# Patient Record
Sex: FEMALE | Race: WHITE | NOT HISPANIC OR LATINO | Employment: FULL TIME | URBAN - METROPOLITAN AREA
[De-identification: names, ages, dates, MRNs, and addresses within clinical notes are randomized per-mention and may not be internally consistent; named-entity substitution may affect disease eponyms.]

---

## 2017-11-14 ENCOUNTER — HOSPITAL ENCOUNTER (EMERGENCY)
Facility: HOSPITAL | Age: 42
Discharge: HOME | End: 2017-11-14
Payer: OTHER MISCELLANEOUS

## 2017-11-14 VITALS
RESPIRATION RATE: 20 BRPM | HEIGHT: 67 IN | HEART RATE: 78 BPM | TEMPERATURE: 98 F | SYSTOLIC BLOOD PRESSURE: 132 MMHG | BODY MASS INDEX: 16.65 KG/M2 | OXYGEN SATURATION: 98 % | DIASTOLIC BLOOD PRESSURE: 65 MMHG | WEIGHT: 106.06 LBS

## 2017-11-14 DIAGNOSIS — M62.830 BACK SPASM: Primary | ICD-10-CM

## 2017-11-14 PROCEDURE — 63600175 PHARM REV CODE 636 W HCPCS: Performed by: PHYSICIAN ASSISTANT

## 2017-11-14 PROCEDURE — 96372 THER/PROPH/DIAG INJ SC/IM: CPT

## 2017-11-14 PROCEDURE — 99283 EMERGENCY DEPT VISIT LOW MDM: CPT | Mod: 25

## 2017-11-14 RX ORDER — ORPHENADRINE CITRATE 30 MG/ML
60 INJECTION INTRAMUSCULAR; INTRAVENOUS
Status: COMPLETED | OUTPATIENT
Start: 2017-11-14 | End: 2017-11-14

## 2017-11-14 RX ORDER — DICLOFENAC SODIUM 75 MG/1
75 TABLET, DELAYED RELEASE ORAL 2 TIMES DAILY
Qty: 20 TABLET | Refills: 0 | Status: SHIPPED | OUTPATIENT
Start: 2017-11-14 | End: 2018-07-19

## 2017-11-14 RX ORDER — METHOCARBAMOL 500 MG/1
1000 TABLET, FILM COATED ORAL 3 TIMES DAILY
Qty: 30 TABLET | Refills: 0 | Status: SHIPPED | OUTPATIENT
Start: 2017-11-14 | End: 2017-11-19

## 2017-11-14 RX ORDER — ORPHENADRINE CITRATE 30 MG/ML
INJECTION INTRAMUSCULAR; INTRAVENOUS
Status: DISCONTINUED
Start: 2017-11-14 | End: 2017-11-14 | Stop reason: HOSPADM

## 2017-11-14 RX ADMIN — ORPHENADRINE CITRATE 60 MG: 30 INJECTION INTRAMUSCULAR; INTRAVENOUS at 08:11

## 2017-11-14 NOTE — ED PROVIDER NOTES
Encounter Date: 11/14/2017       History     Chief Complaint   Patient presents with    Back Pain     Patient c/o low back pain, patient states she was lifting boxes at work this morning and felt a pull to her low back      The history is provided by the patient.   Back Pain    This is a new problem. The current episode started several days ago. The problem occurs occasionally. The problem has been unchanged. The pain is associated with lifting heavy objects. The pain is present in the lumbar spine. The quality of the pain is described as aching. The pain does not radiate. The pain is at a severity of 3/10. The symptoms are aggravated by bending, twisting and certain positions. The pain is the same all the time. Stiffness is present all day. Pertinent negatives include no chest pain, no fever, no numbness, no weight loss, no headaches, no abdominal pain, no abdominal swelling, no bowel incontinence, no perianal numbness, no bladder incontinence, no dysuria, no pelvic pain, no leg pain, no paresthesias, no paresis, no tingling and no weakness. She has tried nothing for the symptoms.     Review of patient's allergies indicates:  No Known Allergies  History reviewed. No pertinent past medical history.  History reviewed. No pertinent surgical history.  History reviewed. No pertinent family history.  Social History   Substance Use Topics    Smoking status: Current Every Day Smoker     Packs/day: 1.00     Types: Cigarettes    Smokeless tobacco: Current User    Alcohol use No     Review of Systems   Constitutional: Negative for chills, fever and weight loss.   HENT: Negative for sore throat.    Eyes: Negative for redness.   Respiratory: Negative for cough and shortness of breath.    Cardiovascular: Negative for chest pain.   Gastrointestinal: Negative for abdominal pain, bowel incontinence, diarrhea and nausea.   Endocrine: Negative for polydipsia and polyphagia.   Genitourinary: Negative for bladder incontinence,  dysuria and pelvic pain.   Musculoskeletal: Positive for back pain. Negative for arthralgias and myalgias.   Skin: Negative for rash.   Neurological: Negative for tingling, weakness, numbness, headaches and paresthesias.   Hematological: Does not bruise/bleed easily.   Psychiatric/Behavioral: The patient is not nervous/anxious.    All other systems reviewed and are negative.      Physical Exam     Initial Vitals [11/14/17 0801]   BP Pulse Resp Temp SpO2   129/70 103 18 98.7 °F (37.1 °C) 100 %      MAP       89.67         Physical Exam    Nursing note and vitals reviewed.  Constitutional: Vital signs are normal. She appears well-developed and well-nourished. No distress.   HENT:   Head: Normocephalic and atraumatic.   Right Ear: External ear normal.   Left Ear: External ear normal.   Nose: Nose normal.   Mouth/Throat: Oropharynx is clear and moist.   Eyes: Conjunctivae, EOM and lids are normal. Pupils are equal, round, and reactive to light.   Neck: Normal range of motion and full passive range of motion without pain. Neck supple.   Cardiovascular: Normal rate, regular rhythm, S1 normal, S2 normal, normal heart sounds, intact distal pulses and normal pulses.   Pulmonary/Chest: Breath sounds normal. No respiratory distress. She has no wheezes. She has no rales.   Abdominal: Soft. Normal appearance and bowel sounds are normal. She exhibits no distension. There is no tenderness.   Musculoskeletal:        Lumbar back: She exhibits decreased range of motion, tenderness, pain and spasm. She exhibits no bony tenderness, no swelling, no edema, no deformity, no laceration and normal pulse.        Back:    Lymphadenopathy:     She has no cervical adenopathy.   Neurological: She is alert and oriented to person, place, and time. She has normal strength. No cranial nerve deficit or sensory deficit. Coordination and gait normal.   Skin: Skin is warm, dry and intact.   Psychiatric: She has a normal mood and affect. Her speech is  normal and behavior is normal. Judgment and thought content normal. Cognition and memory are normal.         ED Course   Procedures  Labs Reviewed - No data to display                            ED Course      Clinical Impression:   The encounter diagnosis was Back spasm.    Disposition:   Disposition: Discharged  Condition: Stable                        LEILANI Morrissey  11/14/17 1000

## 2018-07-19 ENCOUNTER — HOSPITAL ENCOUNTER (EMERGENCY)
Facility: HOSPITAL | Age: 43
Discharge: HOME | End: 2018-07-19
Attending: EMERGENCY MEDICINE
Payer: OTHER MISCELLANEOUS

## 2018-07-19 VITALS
HEIGHT: 66 IN | DIASTOLIC BLOOD PRESSURE: 67 MMHG | WEIGHT: 103.94 LBS | SYSTOLIC BLOOD PRESSURE: 117 MMHG | RESPIRATION RATE: 16 BRPM | OXYGEN SATURATION: 99 % | TEMPERATURE: 98 F | HEART RATE: 90 BPM | BODY MASS INDEX: 16.71 KG/M2

## 2018-07-19 DIAGNOSIS — T14.8XXA MUSCLE STRAIN: ICD-10-CM

## 2018-07-19 DIAGNOSIS — M79.601 PAIN IN RIGHT ARM: Primary | ICD-10-CM

## 2018-07-19 PROCEDURE — 99283 EMERGENCY DEPT VISIT LOW MDM: CPT

## 2018-07-19 RX ORDER — HYDROCODONE BITARTRATE AND ACETAMINOPHEN 7.5; 325 MG/1; MG/1
1 TABLET ORAL EVERY 6 HOURS PRN
Qty: 16 TABLET | Refills: 0 | OUTPATIENT
Start: 2018-07-19 | End: 2024-02-09

## 2018-07-19 NOTE — ED NOTES
Pt examined by Dr Guzman  without RN, educated on prescriptions, given discharge instructions and discharged to [facility]. See provider notes for exam.

## 2018-07-19 NOTE — ED PROVIDER NOTES
SCRIBE #1 NOTE: I, Corinne Mack, am scribing for, and in the presence of, Alice Guzman DO. I have scribed the entire note.      History      Chief Complaint   Patient presents with    Shoulder Injury     pt had a right rotator cuff repair may 30 and a shelf fell on her arm while stocking at work Monday. pt reports her shoulder is sore       Review of patient's allergies indicates:  No Known Allergies     HPI   HPI    7/19/2018, 7:25 AM   History obtained from the patient      History of Present Illness: Amy Leann Goodpasture is a 42 y.o. female patient with PMHx of R rotator cuff repair on 05/30/18 who presents to the Emergency Department for R shoulder pain which onset suddenly 07/16/18. Pt reports that a shelf fell on her shoulder at work. Pt caught the shelf and had to hold it up 20-30 minutes before someone was able to help her. Pt now c/o increased pain to her R shoulder. Pt rates her pain as 7/10. Symptoms are constant and moderate in severity. Movement and palpation worsens the pt's pain. No mitigating factors reported. No associated sxs reported. Patient denies any CP, SOB, N/V, back pain, neck pain, head injury, LOC, HA, dizziness, and all other sxs at this time. No prior Tx reported. Pt reports that she is out of her oxycodone 7.5 and is unable to tolerate her norco 10. Pt reports that she becomes nauseated and vomits when she takes norco 10. No further complaints or concerns at this time. Pt is a former smoker.         Arrival mode: Personal vehicle    PCP: Primary Doctor No       Past Medical History:  Past medical history reviewed not relevant      Past Surgical History:  Past surgical history reviewed not relevant      Family History:  Family history reviewed not relevant      Social History:  Former smoker, no drugs, no alcohol    ROS   Review of Systems   Constitutional: Negative for chills and fever.   HENT: Negative for nosebleeds and sore throat.         (-) head injury   Respiratory:  Negative for cough and shortness of breath.    Cardiovascular: Negative for chest pain and leg swelling.   Gastrointestinal: Negative for abdominal pain, nausea and vomiting.   Genitourinary: Negative for dysuria.   Musculoskeletal: Positive for arthralgias (R shoulder secondary to injury). Negative for back pain, neck pain and neck stiffness.        (-) fall   Skin: Negative for rash and wound.   Neurological: Negative for dizziness, weakness, light-headedness, numbness and headaches.        (-) LOC   Hematological: Does not bruise/bleed easily.   All other systems reviewed and are negative.    Physical Exam      Initial Vitals [07/19/18 0451]   BP Pulse Resp Temp SpO2   117/67 90 16 98.1 °F (36.7 °C) 99 %      MAP       --          Physical Exam  Nursing Notes and Vital Signs Reviewed.  Constitutional: Patient is in no apparent distress. Well-developed and well-nourished.  Head: Atraumatic. Normocephalic.  Eyes: PERRL. EOM intact. Conjunctivae are not pale. No scleral icterus.  ENT: Mucous membranes are moist. Oropharynx is clear and symmetric.    Neck: Supple. Full ROM. No lymphadenopathy.  Cardiovascular: Regular rate. Regular rhythm. No murmurs, rubs, or gallops. Distal pulses are 2+ and symmetric.  Pulmonary/Chest: No respiratory distress. Clear to auscultation bilaterally. No wheezing or rales.  Abdominal: Soft and non-distended.  There is no tenderness.  No rebound, guarding, or rigidity.   Musculoskeletal: Moves all extremities. No obvious deformities. No edema. .   Right Hand: No obvious deformity. There is no swelling.  There is no tenderness.  No snuff box tenderness. Full flexion and extension of the wrist.  Full flexion and extension of all fingers at the DIP, PIP and MCP joints.  Normal finger abduction, adduction, and thumb opposition.  Normal OK sign.  Able to make a fist without scissoring.  No laxity of the ulnar or radial collateral ligaments of the phalanges.  Radial, median, and ulnar nerves  "are intact, both motor and sensory.. Radial and ulnar pulses are 2+. Normal capillary refill.  Distal sensation is intact.   RLE: no evident deformity. Negative for swelling. Positive for tenderness to the deltoid and bicep muscles.. ROM is limited secondary to recent surgery. Pt is not allowed to reach across her chest secondary to limitation of mobility secondary to recent surgery. Cap refill distally is <2 seconds. DP and PT pulses are equal and 2+ bilaterally. No distal sensory deficit  Skin: Warm and dry.  Neurological:  Alert, awake, and appropriate.  Normal speech.  No acute focal neurological deficits are appreciated.  Psychiatric: Normal affect. Good eye contact. Appropriate in content.    ED Course    Procedures  ED Vital Signs:  Vitals:    07/19/18 0451   BP: 117/67   Pulse: 90   Resp: 16   Temp: 98.1 °F (36.7 °C)   TempSrc: Oral   SpO2: 99%   Weight: 47.1 kg (103 lb 15.2 oz)   Height: 5' 6" (1.676 m)       Abnormal Lab Results:  Labs Reviewed - No data to display     All Lab Results:  None    Imaging Results:  Imaging Results    None               7:24 AM:  reviewed  06/06/2018 1  06/06/2018 HYDROCODONE-ACETAMIN  MG 40 10 [id number] [facility] 0 40.00 [facility] [place]   05/25/2018 2  05/24/2018 OXYCODONE-ACETAMINOPHEN  40 4 [id number] [facility] 0 150.00 [facility] [place]   05/25/2018 1  05/24/2018 HYDROCODONE-ACETAMIN  MG 40 6 [id number] [facility] 0 66.67 [facility] [place]   05/17/2018 1  05/17/2018 HYDROCODONE-ACETAMIN 7.5-325 20 5 [id number] [facility] 0 30.00 [place] [place]   12/08/2016 1  12/08/2016 HYDROCODONE-ACETAMIN 7.5-325 15 3 [id number] [facility]  0 37.50 [facility] [place]     7:42 AM   I discussed with the patient/guardian regarding the the quantity of the opioid.  I discussed the patient/guardian's option to fill the prescription in a lesser quantity.   I also discussed with the patient/guardian the risks associated with the opioid.      The Emergency Provider reviewed the " vital signs and test results, which are outlined above.    ED Discussion     7:44 AM: Pt is awake, alert, and in no distress. Discussed pt dx and plan of tx. Pt should resume wearing her sling and continue her weight lifting restrictions of no more than 1 lbs. Pt's orthopedist does not wish the pt to take antiinflammatories. Therefore the pt will be sent home with norco. Gave pt all f/u and return to the ED instructions. All questions and concerns were addressed at this time. Pt expresses understanding of information and instructions, and is comfortable with plan to discharge. Pt is stable for discharge.    I discussed with patient and/or family/caretaker that evaluation in the ED does not suggest any emergent or life threatening medical conditions requiring immediate intervention beyond what was provided in the ED, and I believe patient is safe for discharge.  Regardless, an unremarkable evaluation in the ED does not preclude the development or presence of a serious of life threatening condition. As such, patient was instructed to return immediately for any worsening or change in current symptoms.      ED Medication(s):  Medications - No data to display    Discharge Medication List as of 7/19/2018  7:45 AM      START taking these medications    Details   HYDROcodone-acetaminophen (NORCO) 7.5-325 mg per tablet Take 1 tablet by mouth every 6 (six) hours as needed., Starting Thu 7/19/2018, Print             Follow-up Information     Joseluis Vides Jr, MD.    Specialty:  Orthopedic Surgery  Why:  Appointment scheduled on July 23, 2018.   Return to emergency department for fever, numbness or weakness to the arm, or other concerns.  Do not lift more than 1 lb.  Contact information:  [address] [facility]  [address]  [facility] [address] [phone]             Please follow up.    Contact information:  Consider going to occupational health for urine drug screen.                   Medical Decision Making               Scribe Attestation:   Scribe #1: I performed the above scribed service and the documentation accurately describes the services I performed. I attest to the accuracy of the note.    Attending:   Physician Attestation Statement for Scribe #1: I, Alice Guzman DO, personally performed the services described in this documentation, as scribed by Corinne Mack, in my presence, and it is both accurate and complete.          Clinical Impression       ICD-10-CM ICD-9-CM   1. Pain in right arm M79.601 729.5   2. Muscle strain T14.8XXA 848.9       Disposition:   Disposition: Discharged  Condition: Stable           Alice Guzman DO  07/19/18 7202

## 2021-09-01 ENCOUNTER — LAB VISIT (OUTPATIENT)
Dept: PRIMARY CARE CLINIC | Facility: OTHER | Age: 46
End: 2021-09-01
Attending: INTERNAL MEDICINE

## 2021-09-01 DIAGNOSIS — R05.9 COUGH: ICD-10-CM

## 2021-09-01 DIAGNOSIS — R06.02 SHORTNESS OF BREATH: ICD-10-CM

## 2021-09-01 PROCEDURE — U0003 INFECTIOUS AGENT DETECTION BY NUCLEIC ACID (DNA OR RNA); SEVERE ACUTE RESPIRATORY SYNDROME CORONAVIRUS 2 (SARS-COV-2) (CORONAVIRUS DISEASE [COVID-19]), AMPLIFIED PROBE TECHNIQUE, MAKING USE OF HIGH THROUGHPUT TECHNOLOGIES AS DESCRIBED BY CMS-2020-01-R: HCPCS | Performed by: INTERNAL MEDICINE

## 2021-09-02 LAB
SARS-COV-2 RNA RESP QL NAA+PROBE: DETECTED
SARS-COV-2- CYCLE NUMBER: 15

## 2021-09-10 ENCOUNTER — LAB VISIT (OUTPATIENT)
Dept: PRIMARY CARE CLINIC | Facility: OTHER | Age: 46
End: 2021-09-10
Attending: INTERNAL MEDICINE

## 2021-09-10 DIAGNOSIS — R05.9 COUGH: ICD-10-CM

## 2021-09-10 PROCEDURE — U0003 INFECTIOUS AGENT DETECTION BY NUCLEIC ACID (DNA OR RNA); SEVERE ACUTE RESPIRATORY SYNDROME CORONAVIRUS 2 (SARS-COV-2) (CORONAVIRUS DISEASE [COVID-19]), AMPLIFIED PROBE TECHNIQUE, MAKING USE OF HIGH THROUGHPUT TECHNOLOGIES AS DESCRIBED BY CMS-2020-01-R: HCPCS | Performed by: INTERNAL MEDICINE

## 2021-09-11 LAB
SARS-COV-2 RNA RESP QL NAA+PROBE: DETECTED
SARS-COV-2- CYCLE NUMBER: 42

## 2021-09-15 ENCOUNTER — LAB VISIT (OUTPATIENT)
Dept: PRIMARY CARE CLINIC | Facility: OTHER | Age: 46
End: 2021-09-15
Attending: INTERNAL MEDICINE

## 2021-09-15 DIAGNOSIS — Z20.822 ENCOUNTER FOR LABORATORY TESTING FOR COVID-19 VIRUS: ICD-10-CM

## 2021-09-15 PROCEDURE — U0003 INFECTIOUS AGENT DETECTION BY NUCLEIC ACID (DNA OR RNA); SEVERE ACUTE RESPIRATORY SYNDROME CORONAVIRUS 2 (SARS-COV-2) (CORONAVIRUS DISEASE [COVID-19]), AMPLIFIED PROBE TECHNIQUE, MAKING USE OF HIGH THROUGHPUT TECHNOLOGIES AS DESCRIBED BY CMS-2020-01-R: HCPCS | Performed by: INTERNAL MEDICINE

## 2021-09-16 LAB
SARS-COV-2 RNA RESP QL NAA+PROBE: DETECTED
SARS-COV-2- CYCLE NUMBER: 40

## 2021-12-31 ENCOUNTER — PATIENT MESSAGE (OUTPATIENT)
Dept: ADMINISTRATIVE | Facility: OTHER | Age: 46
End: 2021-12-31
Payer: OTHER MISCELLANEOUS

## 2022-01-07 ENCOUNTER — PATIENT MESSAGE (OUTPATIENT)
Dept: ADMINISTRATIVE | Facility: OTHER | Age: 47
End: 2022-01-07
Payer: OTHER MISCELLANEOUS

## 2022-06-08 ENCOUNTER — PATIENT MESSAGE (OUTPATIENT)
Dept: RESEARCH | Facility: HOSPITAL | Age: 47
End: 2022-06-08
Payer: OTHER MISCELLANEOUS

## 2022-09-20 ENCOUNTER — HOSPITAL ENCOUNTER (EMERGENCY)
Facility: HOSPITAL | Age: 47
Discharge: HOME | End: 2022-09-20
Attending: EMERGENCY MEDICINE
Payer: MEDICAID

## 2022-09-20 VITALS
HEART RATE: 60 BPM | SYSTOLIC BLOOD PRESSURE: 124 MMHG | BODY MASS INDEX: 15.05 KG/M2 | RESPIRATION RATE: 20 BRPM | OXYGEN SATURATION: 98 % | DIASTOLIC BLOOD PRESSURE: 66 MMHG | WEIGHT: 93.25 LBS | TEMPERATURE: 98 F

## 2022-09-20 DIAGNOSIS — S22.41XA CLOSED FRACTURE OF MULTIPLE RIBS OF RIGHT SIDE, INITIAL ENCOUNTER: Primary | ICD-10-CM

## 2022-09-20 DIAGNOSIS — R07.81 RIB PAIN ON RIGHT SIDE: ICD-10-CM

## 2022-09-20 PROCEDURE — 99283 EMERGENCY DEPT VISIT LOW MDM: CPT | Mod: 25

## 2022-09-20 RX ORDER — HYDROCODONE BITARTRATE AND ACETAMINOPHEN 5; 325 MG/1; MG/1
1 TABLET ORAL EVERY 4 HOURS PRN
Qty: 12 TABLET | Refills: 0 | OUTPATIENT
Start: 2022-09-20 | End: 2024-02-09

## 2022-09-20 NOTE — Clinical Note
"Sangeeta Delacruz" Lorne was seen and treated in our emergency department on 9/20/2022.  She may return to work on 09/23/2022.       If you have any questions or concerns, please don't hesitate to call.      Nj Botello NP"

## 2022-09-20 NOTE — ED PROVIDER NOTES
HISTORY     Chief Complaint   Patient presents with    Fall     Slip and fall in shower 1 hour PTA. Denies hitting head and LOC. States she fell on the side of the bathtub and is now c/o of R sided rib pain     Review of patient's allergies indicates:  No Known Allergies     HPI   The history is provided by the patient. No  was used.   Fall  The accident occurred just prior to arrival. Fall occurred: Patient reports she slipped in the tub and struck her right ribs on the edge of the tub. Pain location: Right mid ribs. Pertinent negatives include no neck pain, no back pain, no paresthesias, no paralysis, no visual change, no fever, no numbness, no abdominal pain, no bowel incontinence, no nausea, no vomiting, no hematuria, no headaches, no hearing loss, no loss of consciousness and no tingling.      PCP: Primary Doctor No     Past Medical History:  No past medical history on file.     Past Surgical History:  No past surgical history on file.     Family History:  No family history on file.     Social History:  Social History     Tobacco Use    Smoking status: Former     Packs/day: 1.00     Types: Cigarettes    Smokeless tobacco: Current   Substance and Sexual Activity    Alcohol use: No    Drug use: No    Sexual activity: Not on file         ROS   Review of Systems   Constitutional:  Negative for fever.   HENT:  Negative for sore throat.    Respiratory:  Negative for shortness of breath.         +right rib tenderness   Cardiovascular:  Negative for chest pain.   Gastrointestinal:  Negative for abdominal pain, bowel incontinence, nausea and vomiting.   Genitourinary:  Negative for dysuria and hematuria.   Musculoskeletal:  Negative for back pain and neck pain.   Skin:  Negative for rash.   Neurological:  Negative for tingling, loss of consciousness, weakness, numbness, headaches and paresthesias.   Hematological:  Does not bruise/bleed easily.     PHYSICAL EXAM     Initial Vitals   BP Pulse  Resp Temp SpO2   09/20/22 1337 09/20/22 1337 09/20/22 1337 09/20/22 1335 09/20/22 1337   133/81 65 15 97.5 °F (36.4 °C) 98 %      MAP       --                  Physical Exam    Nursing note and vitals reviewed.  Constitutional: She appears well-developed and well-nourished. She is not diaphoretic. No distress.   HENT:   Head: Normocephalic and atraumatic.   Eyes: Right eye exhibits no discharge. Left eye exhibits no discharge.   Neck: Neck supple.   Normal range of motion.  Cardiovascular:  Normal rate.           Pulmonary/Chest: Breath sounds normal. No respiratory distress. She has no wheezes. She has no rhonchi. She has no rales. She exhibits tenderness (right rib tenderness).   Abdominal: Abdomen is soft. She exhibits no distension.   Musculoskeletal:         General: Normal range of motion.      Cervical back: Normal range of motion and neck supple.     Neurological: She is alert and oriented to person, place, and time. She has normal strength.   Skin: Skin is warm and dry.   Psychiatric: She has a normal mood and affect. Her behavior is normal. Thought content normal.        ED COURSE   Procedures  ED ONGOING VITALS:  Vitals:    09/20/22 1335 09/20/22 1337   BP:  133/81   Pulse:  65   Resp:  15   Temp: 97.5 °F (36.4 °C)    TempSrc: Oral    SpO2:  98%   Weight: 93 lb 4.1 oz (42.3 kg)          ABNORMAL LAB VALUES:  Labs Reviewed   HIV 1 / 2 ANTIBODY   HEPATITIS C ANTIBODY   HEP C VIRUS HOLD SPECIMEN         ALL LAB VALUES:  none      RADIOLOGY STUDIES:  Imaging Results              X-Ray Ribs 2 View Right (Final result)  Result time 09/20/22 14:11:23      Final result by JACKIE Singh Sr., MD (09/20/22 14:11:23)                   Impression:      There is an acute appearing fracture in the posterolateral aspect of the right 10th rib.  The visualized portion of the lungs is clear. There is a suspected fracture in the posterior aspect of the right 11th rib.      Electronically signed by: Amol Singh  MD  Date:    09/20/2022  Time:    14:11               Narrative:    EXAMINATION:  XR RIBS 2 VIEW RIGHT    CLINICAL HISTORY:  Pleurodynia    COMPARISON:  None    FINDINGS:  There is an acute appearing fracture in the posterolateral aspect of the right 10th rib.  The visualized portion of the lungs is clear.  There is a suspected fracture in the posterior aspect of the right 11th rib.  There is no pneumothorax.  The right costophrenic angle is sharp.                                                The above vital signs and test results have been reviewed by the emergency provider.     ED Medications:  Current Discharge Medication List        START taking these medications    Details   HYDROcodone-acetaminophen (NORCO) 5-325 mg per tablet Take 1 tablet by mouth every 4 (four) hours as needed for Pain.  Qty: 12 tablet, Refills: 0    Comments: Quantity prescribed more than 7 day supply? No           Discharge Medications:  New Prescriptions    HYDROCODONE-ACETAMINOPHEN (NORCO) 5-325 MG PER TABLET    Take 1 tablet by mouth every 4 (four) hours as needed for Pain.       Follow-up Information       pcp of choice.    Why: As needed             [facility] - Emergency Dept..    Specialty: Emergency Medicine  Why: If symptoms worsen  Contact information:  [id number] [place] [address] [phone]                          I discussed with patient and/or family/caretaker that evaluation in the ED does not suggest any emergent or life threatening medical conditions requiring immediate intervention beyond what was provided in the ED, and I believe patient is safe for discharge. Regardless, an unremarkable evaluation in the ED does not preclude the development or presence of a serious or life threatening condition. As such, patient was instructed to return immediately for any worsening or change in current symptoms.        MEDICAL DECISION MAKING   Medical Decision Making:   ED Management:  Patient  provided with an incentive spirometer in the ED.             CLINICAL IMPRESSION       ICD-10-CM ICD-9-CM   1. Closed fracture of multiple ribs of right side, initial encounter  S22.41XA 807.09   2. Rib pain on right side  R07.81 786.50       Disposition:   Disposition: Discharged  Condition: Stable       Nj Botello NP  09/20/22 1530

## 2024-02-09 ENCOUNTER — HOSPITAL ENCOUNTER (EMERGENCY)
Facility: HOSPITAL | Age: 49
Discharge: HOME | End: 2024-02-09
Attending: EMERGENCY MEDICINE
Payer: COMMERCIAL

## 2024-02-09 VITALS
BODY MASS INDEX: 14.25 KG/M2 | SYSTOLIC BLOOD PRESSURE: 125 MMHG | HEART RATE: 89 BPM | TEMPERATURE: 99 F | WEIGHT: 88.31 LBS | RESPIRATION RATE: 18 BRPM | OXYGEN SATURATION: 100 % | DIASTOLIC BLOOD PRESSURE: 66 MMHG

## 2024-02-09 DIAGNOSIS — H00.015 HORDEOLUM EXTERNUM OF LEFT LOWER EYELID: Primary | ICD-10-CM

## 2024-02-09 LAB
ALBUMIN SERPL BCP-MCNC: 3.5 G/DL (ref 3.5–5.2)
ALP SERPL-CCNC: 93 U/L (ref 55–135)
ALT SERPL W/O P-5'-P-CCNC: 17 U/L (ref 10–44)
ANION GAP SERPL CALC-SCNC: 9 MMOL/L (ref 8–16)
AST SERPL-CCNC: 19 U/L (ref 10–40)
BASOPHILS # BLD AUTO: 0.05 K/UL (ref 0–0.2)
BASOPHILS NFR BLD: 0.8 % (ref 0–1.9)
BILIRUB SERPL-MCNC: 0.2 MG/DL (ref 0.1–1)
BUN SERPL-MCNC: 8 MG/DL (ref 6–20)
CALCIUM SERPL-MCNC: 9.3 MG/DL (ref 8.7–10.5)
CHLORIDE SERPL-SCNC: 106 MMOL/L (ref 95–110)
CO2 SERPL-SCNC: 25 MMOL/L (ref 23–29)
CREAT SERPL-MCNC: 0.8 MG/DL (ref 0.5–1.4)
CRP SERPL-MCNC: 2.4 MG/L (ref 0–8.2)
DIFFERENTIAL METHOD BLD: ABNORMAL
EOSINOPHIL # BLD AUTO: 0.2 K/UL (ref 0–0.5)
EOSINOPHIL NFR BLD: 3.6 % (ref 0–8)
ERYTHROCYTE [DISTWIDTH] IN BLOOD BY AUTOMATED COUNT: 12.2 % (ref 11.5–14.5)
EST. GFR  (NO RACE VARIABLE): >60 ML/MIN/1.73 M^2
GLUCOSE SERPL-MCNC: 76 MG/DL (ref 70–110)
HCT VFR BLD AUTO: 33.7 % (ref 37–48.5)
HGB BLD-MCNC: 11.1 G/DL (ref 12–16)
IMM GRANULOCYTES # BLD AUTO: 0.02 K/UL (ref 0–0.04)
IMM GRANULOCYTES NFR BLD AUTO: 0.3 % (ref 0–0.5)
LYMPHOCYTES # BLD AUTO: 2.1 K/UL (ref 1–4.8)
LYMPHOCYTES NFR BLD: 32.8 % (ref 18–48)
MCH RBC QN AUTO: 30.9 PG (ref 27–31)
MCHC RBC AUTO-ENTMCNC: 32.9 G/DL (ref 32–36)
MCV RBC AUTO: 94 FL (ref 82–98)
MONOCYTES # BLD AUTO: 0.6 K/UL (ref 0.3–1)
MONOCYTES NFR BLD: 9.2 % (ref 4–15)
NEUTROPHILS # BLD AUTO: 3.4 K/UL (ref 1.8–7.7)
NEUTROPHILS NFR BLD: 53.3 % (ref 38–73)
NRBC BLD-RTO: 0 /100 WBC
PLATELET # BLD AUTO: 177 K/UL (ref 150–450)
PMV BLD AUTO: 13 FL (ref 9.2–12.9)
POTASSIUM SERPL-SCNC: 3.5 MMOL/L (ref 3.5–5.1)
PROT SERPL-MCNC: 6.9 G/DL (ref 6–8.4)
RBC # BLD AUTO: 3.59 M/UL (ref 4–5.4)
SODIUM SERPL-SCNC: 140 MMOL/L (ref 136–145)
WBC # BLD AUTO: 6.38 K/UL (ref 3.9–12.7)

## 2024-02-09 PROCEDURE — 80053 COMPREHEN METABOLIC PANEL: CPT | Performed by: NURSE PRACTITIONER

## 2024-02-09 PROCEDURE — 86140 C-REACTIVE PROTEIN: CPT | Performed by: NURSE PRACTITIONER

## 2024-02-09 PROCEDURE — 85025 COMPLETE CBC W/AUTO DIFF WBC: CPT | Performed by: NURSE PRACTITIONER

## 2024-02-09 PROCEDURE — 99284 EMERGENCY DEPT VISIT MOD MDM: CPT | Mod: 25

## 2024-02-09 RX ORDER — SULFAMETHOXAZOLE AND TRIMETHOPRIM 800; 160 MG/1; MG/1
1 TABLET ORAL 2 TIMES DAILY
Qty: 14 TABLET | Refills: 0 | Status: SHIPPED | OUTPATIENT
Start: 2024-02-09 | End: 2024-02-16

## 2024-02-09 RX ORDER — ERYTHROMYCIN 5 MG/G
OINTMENT OPHTHALMIC
Qty: 1 EACH | Refills: 0 | Status: SHIPPED | OUTPATIENT
Start: 2024-02-09

## 2024-02-09 NOTE — FIRST PROVIDER EVALUATION
Medical screening examination initiated.  I have conducted a focused provider triage encounter, findings are as follows:    Brief history of present illness:  Patient complains of left eye infection was told to come for cellulitis evaluation    There were no vitals filed for this visit.    Pertinent physical exam:  Left eye infected    Brief workup plan:  CT orbits    Preliminary workup initiated; this workup will be continued and followed by the physician or advanced practice provider that is assigned to the patient when roomed.

## 2024-02-09 NOTE — ED PROVIDER NOTES
SCRIBE #1 NOTE: I, Chacorta Pelaez, am scribing for, and in the presence of, Latosha Vasquez MD. I have scribed the entire note.      History      Chief Complaint   Patient presents with    Eye Problem     Left eye cellulitis with drainage. Evaluated at the  for the same. +redness.        Review of patient's allergies indicates:  No Known Allergies     HPI   HPI    2/9/2024, 1:11 PM   History obtained from the patient      History of Present Illness: Sangeeta Pradhan is a 48 y.o. female patient who presents to the Emergency Department for L eye pain, onset 2 days PTA. Pt was seen at urgent care 2 days ago and prescribed on Augmentin and bacitracin ointment; pt states that she had started taking the Augmentin, but was unable to get the ointment filled. Symptoms are constant and moderate in severity. No mitigating or exacerbating factors reported. Associated sxs include L eye redness, L eye itching, and clear L eye drainage. Patient denies any trauma/injury, fever, chills, n/v/d, SOB, CP, weakness, numbness, dizziness, headache, and all other sxs at this time. No further complaints or concerns at this time.     Arrival mode: Personal vehicle     PCP: No, Primary Doctor       Past Medical History:  No past medical history on file.    Past Surgical History:  No past surgical history on file.      Family History:  No family history on file.    Social History:  Social History     Tobacco Use    Smoking status: Former     Current packs/day: 1.00     Types: Cigarettes    Smokeless tobacco: Current   Substance and Sexual Activity    Alcohol use: No    Drug use: No    Sexual activity: Not on file       ROS   Review of Systems   Constitutional:  Negative for chills and fever.   HENT:  Negative for sore throat.    Eyes:  Positive for pain (L), discharge (L, clear), redness (L) and itching (L). Negative for visual disturbance.   Respiratory:  Negative for shortness of breath.    Cardiovascular:  Negative for chest pain.    Gastrointestinal:  Negative for diarrhea, nausea and vomiting.   Genitourinary:  Negative for dysuria.   Musculoskeletal:  Negative for back pain.   Skin:  Negative for rash.   Neurological:  Negative for dizziness, weakness, numbness and headaches.   Hematological:  Does not bruise/bleed easily.   All other systems reviewed and are negative.    Physical Exam      Initial Vitals [02/09/24 1208]   BP Pulse Resp Temp SpO2   125/66 89 18 98.8 °F (37.1 °C) 100 %      MAP       --          Physical Exam  Nursing Notes and Vital Signs Reviewed.  Constitutional: Patient is in no acute distress. Well-developed and well-nourished.  Head: Atraumatic. Normocephalic. No significant facial swelling.  Eyes: PERRL. EOM intact. Small stye to L lower medial eyelid. No drainage. Mild L conjunctival injection. Trace left lower periorbital soft tissue swelling. No crepitance. No proptosis.   ENT: Mucous membranes are moist. Oropharynx is clear and symmetric.    Neck: Supple. Full ROM. No lymphadenopathy.  Cardiovascular: Regular rate. Regular rhythm. No murmurs, rubs, or gallops. Distal pulses are 2+ and symmetric.  Pulmonary/Chest: No respiratory distress. Clear to auscultation bilaterally. No wheezing or rales.  Abdominal: Soft and non-distended.  There is no tenderness.  No rebound, guarding, or rigidity.   Musculoskeletal: Moves all extremities. No obvious deformities. No edema.  Skin: Warm and dry.  Neurological:  Alert, awake, and appropriate.  Normal speech.  No acute focal neurological deficits are appreciated.  Psychiatric: Normal affect. Good eye contact. Appropriate in content.    ED Course    Procedures  ED Vital Signs:  Vitals:    02/09/24 1208   BP: 125/66   Pulse: 89   Resp: 18   Temp: 98.8 °F (37.1 °C)   TempSrc: Oral   SpO2: 100%   Weight: 40.1 kg (88 lb 4.7 oz)       Abnormal Lab Results:  Labs Reviewed   CBC W/ AUTO DIFFERENTIAL - Abnormal; Notable for the following components:       Result Value    RBC 3.59 (*)      Hemoglobin 11.1 (*)     Hematocrit 33.7 (*)     MPV 13.0 (*)     All other components within normal limits   COMPREHENSIVE METABOLIC PANEL   C-REACTIVE PROTEIN      Results for orders placed or performed during the hospital encounter of 02/09/24   CBC auto differential   Result Value Ref Range    WBC 6.38 3.90 - 12.70 K/uL    RBC 3.59 (L) 4.00 - 5.40 M/uL    Hemoglobin 11.1 (L) 12.0 - 16.0 g/dL    Hematocrit 33.7 (L) 37.0 - 48.5 %    MCV 94 82 - 98 fL    MCH 30.9 27.0 - 31.0 pg    MCHC 32.9 32.0 - 36.0 g/dL    RDW 12.2 11.5 - 14.5 %    Platelets 177 150 - 450 K/uL    MPV 13.0 (H) 9.2 - 12.9 fL    Immature Granulocytes 0.3 0.0 - 0.5 %    Gran # (ANC) 3.4 1.8 - 7.7 K/uL    Immature Grans (Abs) 0.02 0.00 - 0.04 K/uL    Lymph # 2.1 1.0 - 4.8 K/uL    Mono # 0.6 0.3 - 1.0 K/uL    Eos # 0.2 0.0 - 0.5 K/uL    Baso # 0.05 0.00 - 0.20 K/uL    nRBC 0 0 /100 WBC    Gran % 53.3 38.0 - 73.0 %    Lymph % 32.8 18.0 - 48.0 %    Mono % 9.2 4.0 - 15.0 %    Eosinophil % 3.6 0.0 - 8.0 %    Basophil % 0.8 0.0 - 1.9 %    Differential Method Automated    Comprehensive metabolic panel   Result Value Ref Range    Sodium 140 136 - 145 mmol/L    Potassium 3.5 3.5 - 5.1 mmol/L    Chloride 106 95 - 110 mmol/L    CO2 25 23 - 29 mmol/L    Glucose 76 70 - 110 mg/dL    BUN 8 6 - 20 mg/dL    Creatinine 0.8 0.5 - 1.4 mg/dL    Calcium 9.3 8.7 - 10.5 mg/dL    Total Protein 6.9 6.0 - 8.4 g/dL    Albumin 3.5 3.5 - 5.2 g/dL    Total Bilirubin 0.2 0.1 - 1.0 mg/dL    Alkaline Phosphatase 93 55 - 135 U/L    AST 19 10 - 40 U/L    ALT 17 10 - 44 U/L    eGFR >60 >60 mL/min/1.73 m^2    Anion Gap 9 8 - 16 mmol/L   C-reactive protein   Result Value Ref Range    CRP 2.4 0.0 - 8.2 mg/L         Imaging Results:  Imaging Results              CT Orbits Sella Post Fossa Without Cont (Final result)  Result time 02/09/24 13:24:02      Final result by Darren Schmitz MD (02/09/24 13:24:02)                   Impression:      Left periorbital edema or inflammatory change.   No evidence of abscess formation or intraorbital involvement.    All CT scans at this facility use dose modulation, iterative reconstruction, and/or weight based dosing when appropriate to reduce radiation dose to as low as reasonably achievable.      Electronically signed by: Darren Schmitz  Date:    02/09/2024  Time:    13:24               Narrative:    EXAMINATION:  CT ORBITS SELLA POST FOSSA WITHOUT CONT    CLINICAL HISTORY:  Orbital cellulitis suspected;    TECHNIQUE:  CT scan of the orbits without contrast.    COMPARISON:  None    FINDINGS:  The visualized paranasal sinuses are clear.  No acute osseous abnormality is identified.  The intraorbital structures appear normal in the globes are intact.  There is some edema in the left periorbital region.  No abscess formation is identified.                                              The Emergency Provider reviewed the vital signs and test results, which are outlined above.    ED Discussion     1:13 PM: Reassessed pt at this time. Discussed with pt all pertinent ED information and results. Discussed pt dx and plan of tx. Gave pt all f/u and return to the ED instructions. All questions and concerns were addressed at this time. Pt expresses understanding of information and instructions, and is comfortable with plan to discharge. Pt is stable for discharge.    I discussed with patient and/or family/caretaker that evaluation in the ED does not suggest any emergent or life threatening medical conditions requiring immediate intervention beyond what was provided in the ED, and I believe patient is safe for discharge.  Regardless, an unremarkable evaluation in the ED does not preclude the development or presence of a serious of life threatening condition. As such, patient was instructed to return immediately for any worsening or change in current symptoms.         ED Medication(s):  Medications - No data to display     Follow-up Information       Opthalmology. Schedule an  appointment as soon as possible for a visit in 3 days.    Specialty: Ophthalmology  Why: Return to the Emergency Room, If symptoms worsen  Contact information:  [address]  [facility] [address]  [phone]                         Discharge Medication List as of 2/9/2024  1:22 PM        START taking these medications    Details   erythromycin (ROMYCIN) ophthalmic ointment Place a 1/2 inch ribbon of ointment into the left lower eyelid 6 times daily., Print      sulfamethoxazole-trimethoprim 800-160mg (BACTRIM DS) 800-160 mg Tab Take 1 tablet by mouth 2 (two) times daily. for 7 days, Starting Fri 2/9/2024, Until Fri 2/16/2024, Print               Medical Decision Making    Medical Decision Making  DDX:  1. Infected stye  2. Facial cellulitis  3. Periorbital cellulitis    Presents with small draining cyst to left inner lower eyelid, seen at urgent care yesterday given rx for augmentin and bacitracian ointment, taking oral antibiotics, did not get topical medicine, now c/o lower periorbital swelling, no pain but has itching, no eye drainage, no trauma, does smoke, lab work and imaging reviewed, CBC normal, bmp normal, CT orbits reviewed and shows mild periorbital swelling, overall feel she is safe for discharge, warm compresses continue with augmentin, add bactrim, topical ointment and close ophthalmology follow up and recheck in 1-2 days if no improvement. Reasons to return given.     Amount and/or Complexity of Data Reviewed  External Data Reviewed: notes.     Details: Hx of substance abuse with recent over dose in jan 2024  Labs: ordered. Decision-making details documented in ED Course.  Radiology: ordered. Decision-making details documented in ED Course.    Risk  Prescription drug management.  Decision regarding hospitalization.       Additional MDM:   Smoking Cessation: The patient is a smoker. The patient was counseled on smoking cessation for: 4 minutes. The patient was counseled on  tobacco related  health complications.           Scribe Attestation:   Scribe #1: I performed the above scribed service and the documentation accurately describes the services I performed. I attest to the accuracy of the note.    Attending:   Physician Attestation Statement for Scribe #1: I, Latosha Vsaquez MD, personally performed the services described in this documentation, as scribed by Chacorta Pelaez, in my presence, and it is both accurate and complete.          Clinical Impression       ICD-10-CM ICD-9-CM   1. Hordeolum externum of left lower eyelid  H00.015 373.11       Disposition:   Disposition: Discharged  Condition: Stable         Latosha Vasquez MD  02/10/24 1046

## 2024-02-09 NOTE — Clinical Note
"Sangeeta Delacruz" Lorne was seen and treated in our emergency department on 2/9/2024.  She may return to work on 02/12/2024.       If you have any questions or concerns, please don't hesitate to call.      Latosha Vasquez MD"

## 2024-04-29 ENCOUNTER — HOSPITAL ENCOUNTER (EMERGENCY)
Facility: HOSPITAL | Age: 49
Discharge: HOME | End: 2024-04-29
Attending: EMERGENCY MEDICINE
Payer: COMMERCIAL

## 2024-04-29 VITALS
WEIGHT: 86 LBS | RESPIRATION RATE: 12 BRPM | SYSTOLIC BLOOD PRESSURE: 106 MMHG | BODY MASS INDEX: 13.82 KG/M2 | DIASTOLIC BLOOD PRESSURE: 59 MMHG | TEMPERATURE: 97 F | OXYGEN SATURATION: 100 % | HEIGHT: 66 IN | HEART RATE: 65 BPM

## 2024-04-29 DIAGNOSIS — R06.02 SHORTNESS OF BREATH: ICD-10-CM

## 2024-04-29 DIAGNOSIS — T50.905A ADVERSE EFFECT OF DRUG, INITIAL ENCOUNTER: ICD-10-CM

## 2024-04-29 DIAGNOSIS — R42 LIGHTHEADEDNESS: Primary | ICD-10-CM

## 2024-04-29 LAB
ALBUMIN SERPL BCP-MCNC: 3.6 G/DL (ref 3.5–5.2)
ALP SERPL-CCNC: 109 U/L (ref 55–135)
ALT SERPL W/O P-5'-P-CCNC: 21 U/L (ref 10–44)
ANION GAP SERPL CALC-SCNC: 5 MMOL/L (ref 8–16)
AST SERPL-CCNC: 22 U/L (ref 10–40)
BASOPHILS # BLD AUTO: 0.04 K/UL (ref 0–0.2)
BASOPHILS NFR BLD: 0.7 % (ref 0–1.9)
BILIRUB SERPL-MCNC: 0.3 MG/DL (ref 0.1–1)
BNP SERPL-MCNC: 10 PG/ML (ref 0–99)
BUN SERPL-MCNC: 17 MG/DL (ref 6–20)
CALCIUM SERPL-MCNC: 9.1 MG/DL (ref 8.7–10.5)
CHLORIDE SERPL-SCNC: 103 MMOL/L (ref 95–110)
CO2 SERPL-SCNC: 28 MMOL/L (ref 23–29)
CREAT SERPL-MCNC: 0.9 MG/DL (ref 0.5–1.4)
DIFFERENTIAL METHOD BLD: ABNORMAL
EOSINOPHIL # BLD AUTO: 0.2 K/UL (ref 0–0.5)
EOSINOPHIL NFR BLD: 4 % (ref 0–8)
ERYTHROCYTE [DISTWIDTH] IN BLOOD BY AUTOMATED COUNT: 12.8 % (ref 11.5–14.5)
EST. GFR  (NO RACE VARIABLE): >60 ML/MIN/1.73 M^2
GLUCOSE SERPL-MCNC: 85 MG/DL (ref 70–110)
HCT VFR BLD AUTO: 35.8 % (ref 37–48.5)
HGB BLD-MCNC: 11.6 G/DL (ref 12–16)
IMM GRANULOCYTES # BLD AUTO: 0.01 K/UL (ref 0–0.04)
IMM GRANULOCYTES NFR BLD AUTO: 0.2 % (ref 0–0.5)
INFLUENZA A, MOLECULAR: NEGATIVE
INFLUENZA B, MOLECULAR: NEGATIVE
LYMPHOCYTES # BLD AUTO: 1.8 K/UL (ref 1–4.8)
LYMPHOCYTES NFR BLD: 30.1 % (ref 18–48)
MCH RBC QN AUTO: 30.8 PG (ref 27–31)
MCHC RBC AUTO-ENTMCNC: 32.4 G/DL (ref 32–36)
MCV RBC AUTO: 95 FL (ref 82–98)
MONOCYTES # BLD AUTO: 0.5 K/UL (ref 0.3–1)
MONOCYTES NFR BLD: 8.5 % (ref 4–15)
NEUTROPHILS # BLD AUTO: 3.4 K/UL (ref 1.8–7.7)
NEUTROPHILS NFR BLD: 56.5 % (ref 38–73)
NRBC BLD-RTO: 0 /100 WBC
OHS QRS DURATION: 78 MS
OHS QTC CALCULATION: 405 MS
PLATELET # BLD AUTO: 204 K/UL (ref 150–450)
PMV BLD AUTO: 11.9 FL (ref 9.2–12.9)
POTASSIUM SERPL-SCNC: 3.9 MMOL/L (ref 3.5–5.1)
PROT SERPL-MCNC: 6.6 G/DL (ref 6–8.4)
RBC # BLD AUTO: 3.77 M/UL (ref 4–5.4)
SARS-COV-2 RDRP RESP QL NAA+PROBE: NEGATIVE
SODIUM SERPL-SCNC: 136 MMOL/L (ref 136–145)
SPECIMEN SOURCE: NORMAL
TROPONIN I SERPL DL<=0.01 NG/ML-MCNC: 0.01 NG/ML (ref 0–0.03)
WBC # BLD AUTO: 6.02 K/UL (ref 3.9–12.7)

## 2024-04-29 PROCEDURE — 87502 INFLUENZA DNA AMP PROBE: CPT | Performed by: NURSE PRACTITIONER

## 2024-04-29 PROCEDURE — 83880 ASSAY OF NATRIURETIC PEPTIDE: CPT | Performed by: NURSE PRACTITIONER

## 2024-04-29 PROCEDURE — U0002 COVID-19 LAB TEST NON-CDC: HCPCS | Performed by: NURSE PRACTITIONER

## 2024-04-29 PROCEDURE — 25000003 PHARM REV CODE 250: Performed by: EMERGENCY MEDICINE

## 2024-04-29 PROCEDURE — 99285 EMERGENCY DEPT VISIT HI MDM: CPT | Mod: 25

## 2024-04-29 PROCEDURE — 96360 HYDRATION IV INFUSION INIT: CPT

## 2024-04-29 PROCEDURE — 93010 ELECTROCARDIOGRAM REPORT: CPT | Mod: ,,, | Performed by: INTERNAL MEDICINE

## 2024-04-29 PROCEDURE — 84484 ASSAY OF TROPONIN QUANT: CPT | Performed by: NURSE PRACTITIONER

## 2024-04-29 PROCEDURE — 93005 ELECTROCARDIOGRAM TRACING: CPT

## 2024-04-29 PROCEDURE — 80053 COMPREHEN METABOLIC PANEL: CPT | Performed by: NURSE PRACTITIONER

## 2024-04-29 PROCEDURE — 85025 COMPLETE CBC W/AUTO DIFF WBC: CPT | Performed by: NURSE PRACTITIONER

## 2024-04-29 RX ADMIN — SODIUM CHLORIDE 1000 ML: 9 INJECTION, SOLUTION INTRAVENOUS at 01:04

## 2024-04-29 NOTE — Clinical Note
"Sangeeta Ohara (Amy)e was seen and treated in our emergency department on 4/29/2024.  She may return to work on 04/30/2024.       If you have any questions or concerns, please don't hesitate to call.      Jeanine Porter RN    "

## 2024-04-29 NOTE — FIRST PROVIDER EVALUATION
Medical screening examination initiated.  I have conducted a focused provider triage encounter, findings are as follows:    Brief history of present illness:  Presents the ER for shortness of breath and dizziness.    Vitals:    04/29/24 1130   BP: 126/62   BP Location: Right arm   Patient Position: Sitting   Pulse: 78   Resp: 18   Temp: 97.2 °F (36.2 °C)   TempSrc: Oral   SpO2: 100%       Pertinent physical exam:  cardiac workup    Brief workup plan:  labs, EKG, imaging     Preliminary workup initiated; this workup will be continued and followed by the physician or advanced practice provider that is assigned to the patient when roomed.

## 2024-04-29 NOTE — ED PROVIDER NOTES
"Emergency Medicine Provider Note - 4/29/2024       SCRIBE NOTE: I, Lanie Jacobsen am scribing for, and in the presence of, Alice Guzman DO.     History     Chief Complaint   Patient presents with    Dizziness     States she stood up and got diaphoretic and dizzy. Hx of syncope       Allergies:  Review of patient's allergies indicates:  No Known Allergies     History of Present Illness   HPI    4/29/2024, 12:47 PM  The history is provided by the  mother-in-law and patient    Sangeeta Pradhan is a 48 y.o. female with no PMHx on file presenting to the ED for light-headedness which onset this morning. The pt states that she woke up this morning and began preparing to go to work, when she started experiencing light-headedness, dizziness, SOB, and diaphoresis. This occurred when she went from a sitting position to standing.  At 1st she felt lightheaded, dizzy (like she was going to pass out) and then broke out in a sweat.  Symptoms were relieved by laying down.  She felt slightly short of breath.  Pt's mother-in-law states that her son noticed that the pt appeared pale during the episode. Pt states that she felt like she was "burning up." Symptoms are constant and moderate in severity. No mitigating or exacerbating factors reported. Patient denies any fever, chills, n/v/d, abdominal pain, numbness, weakness, headaches, CP, melena, cough, calf pain, calf tenderness and all other sxs at this time. Pt states that she took 10 mg Valium and 2 carbidopa around 7 PM last night. No further complaints or concerns at this time.        Arrival mode: Private Vehicle     PCP: No, Primary Doctor     Past Medical History:  No past medical history on file.    Past Surgical History:  No past surgical history on file.      Family History:  No family history on file.    Social History:  Social History     Tobacco Use    Smoking status: Former     Current packs/day: 1.00     Types: Cigarettes    Smokeless tobacco: Current "   Substance and Sexual Activity    Alcohol use: No    Drug use: No    Sexual activity: Not on file        Review of Systems   Review of Systems   Constitutional:  Positive for diaphoresis (While standing). Negative for chills and fever.   HENT:  Negative for sore throat.    Respiratory:  Negative for cough and shortness of breath.    Cardiovascular:  Negative for chest pain.   Gastrointestinal:  Negative for abdominal pain, diarrhea, nausea and vomiting.        (-) melena   Genitourinary:  Negative for dysuria.   Musculoskeletal:  Negative for back pain.   Skin:  Negative for rash.   Neurological:  Positive for dizziness (While standing) and light-headedness (While standing). Negative for weakness, numbness and headaches.   Hematological:  Does not bruise/bleed easily.   All other systems reviewed and are negative.     Physical Exam     Initial Vitals [04/29/24 1130]   BP Pulse Resp Temp SpO2   126/62 78 18 97.2 °F (36.2 °C) 100 %      MAP       --          Physical Exam    Nursing Notes and Vital Signs Reviewed.  Constitutional: Patient is in no acute distress. Well-developed and well-nourished.  Head: Atraumatic. Normocephalic.  Eyes: Pupils are pinpoint. EOM intact. Conjunctivae are not pale. No scleral icterus.  ENT: Mucous membranes are moist. Oropharynx is clear and symmetric.    Neck: Supple. Full ROM. No lymphadenopathy.  Cardiovascular: Regular rate. Regular rhythm. No murmurs, rubs, or gallops. Distal pulses are 2+ and symmetric.  Pulmonary/Chest: No respiratory distress. Clear to auscultation bilaterally. No wheezing or rales.  Abdominal: Soft and non-distended.  There is no tenderness.  No rebound, guarding, or rigidity. Good bowel sounds.  Genitourinary: No CVA tenderness  Musculoskeletal: Moves all extremities. No obvious deformities. No edema. No calf tenderness.  Skin: Warm and dry.  Neurological:  Alert, awake, and appropriate.  Normal speech.  No acute focal neurological deficits are  "appreciated.  Psychiatric: Patient appears sleepy, but is easily awakened.     ED Course   ED Procedures:  Procedures    ED Vital Signs:  Vitals:    04/29/24 1130 04/29/24 1147 04/29/24 1149 04/29/24 1151   BP: 126/62 (!) 101/57 107/61 (!) 106/57   Pulse: 78 64 68 76   Resp: 18      Temp: 97.2 °F (36.2 °C)      TempSrc: Oral      SpO2: 100% 99% 100% 100%   Weight:       Height:        04/29/24 1304 04/29/24 1307 04/29/24 1309 04/29/24 1312   BP:  99/62 111/63 111/68   Pulse:  74 62 78   Resp:  16 16 17   Temp:       TempSrc:       SpO2:  100% 100% 100%   Weight: 39 kg (86 lb)      Height: 5' 6" (1.676 m)       04/29/24 1315 04/29/24 1330 04/29/24 1400 04/29/24 1430   BP: 111/68 (!) 103/55 (!) 98/57 (!) 99/54   Pulse: 75 64 67 66   Resp: 18 14 13 18   Temp:       TempSrc:       SpO2: 99% 99% 100% 100%   Weight:       Height:        04/29/24 1530 04/29/24 1600   BP: (!) 114/58 (!) 106/59   Pulse: 77 65   Resp: 15 12   Temp:     TempSrc:     SpO2: 98% 100%   Weight:     Height:         Abnormal Lab Results:  Labs Reviewed   CBC W/ AUTO DIFFERENTIAL - Abnormal; Notable for the following components:       Result Value    RBC 3.77 (*)     Hemoglobin 11.6 (*)     Hematocrit 35.8 (*)     All other components within normal limits   COMPREHENSIVE METABOLIC PANEL - Abnormal; Notable for the following components:    Anion Gap 5 (*)     All other components within normal limits   INFLUENZA A & B BY MOLECULAR   TROPONIN I   B-TYPE NATRIURETIC PEPTIDE   SARS-COV-2 RNA AMPLIFICATION, QUAL        All Lab Results:  Results for orders placed or performed during the hospital encounter of 04/29/24   Influenza A & B by Molecular    Specimen: Nasopharyngeal Swab   Result Value Ref Range    Influenza A, Molecular Negative Negative    Influenza B, Molecular Negative Negative    Flu A & B Source Nasal swab    CBC Auto Differential   Result Value Ref Range    WBC 6.02 3.90 - 12.70 K/uL    RBC 3.77 (L) 4.00 - 5.40 M/uL    Hemoglobin 11.6 (L) " 12.0 - 16.0 g/dL    Hematocrit 35.8 (L) 37.0 - 48.5 %    MCV 95 82 - 98 fL    MCH 30.8 27.0 - 31.0 pg    MCHC 32.4 32.0 - 36.0 g/dL    RDW 12.8 11.5 - 14.5 %    Platelets 204 150 - 450 K/uL    MPV 11.9 9.2 - 12.9 fL    Immature Granulocytes 0.2 0.0 - 0.5 %    Gran # (ANC) 3.4 1.8 - 7.7 K/uL    Immature Grans (Abs) 0.01 0.00 - 0.04 K/uL    Lymph # 1.8 1.0 - 4.8 K/uL    Mono # 0.5 0.3 - 1.0 K/uL    Eos # 0.2 0.0 - 0.5 K/uL    Baso # 0.04 0.00 - 0.20 K/uL    nRBC 0 0 /100 WBC    Gran % 56.5 38.0 - 73.0 %    Lymph % 30.1 18.0 - 48.0 %    Mono % 8.5 4.0 - 15.0 %    Eosinophil % 4.0 0.0 - 8.0 %    Basophil % 0.7 0.0 - 1.9 %    Differential Method Automated    Comprehensive Metabolic Panel   Result Value Ref Range    Sodium 136 136 - 145 mmol/L    Potassium 3.9 3.5 - 5.1 mmol/L    Chloride 103 95 - 110 mmol/L    CO2 28 23 - 29 mmol/L    Glucose 85 70 - 110 mg/dL    BUN 17 6 - 20 mg/dL    Creatinine 0.9 0.5 - 1.4 mg/dL    Calcium 9.1 8.7 - 10.5 mg/dL    Total Protein 6.6 6.0 - 8.4 g/dL    Albumin 3.6 3.5 - 5.2 g/dL    Total Bilirubin 0.3 0.1 - 1.0 mg/dL    Alkaline Phosphatase 109 55 - 135 U/L    AST 22 10 - 40 U/L    ALT 21 10 - 44 U/L    eGFR >60 >60 mL/min/1.73 m^2    Anion Gap 5 (L) 8 - 16 mmol/L   Troponin I   Result Value Ref Range    Troponin I 0.008 0.000 - 0.026 ng/mL   Brain natriuretic peptide   Result Value Ref Range    BNP 10 0 - 99 pg/mL   COVID-19 Rapid Screening   Result Value Ref Range    SARS-CoV-2 RNA, Amplification, Qual Negative Negative   EKG 12-lead   Result Value Ref Range    QRS Duration 78 ms    [facility] QTC Calculation 405 ms      Reviewed Prior Labs:   Latest Reference Range & Units 02/09/24 12:26 04/29/24 12:00   Hemoglobin 12.0 - 16.0 g/dL 11.1 (L) 11.6 (L)   Hematocrit 37.0 - 48.5 % 33.7 (L) 35.8 (L)   (L): Data is abnormally low    The EKG was ordered, reviewed, and independently interpreted by the ED provider:  ECG Results              EKG 12-lead (Final result)        Collection Time Result Time  QRS Duration [facility] QTC Calculation    04/29/24 11:43:37 04/29/24 20:09:45 78 405                     Final result by Interface, Lab In [facility] (04/29/24 20:09:54)                   Narrative:    Test Reason : R06.02,    Vent. Rate : 067 BPM     Atrial Rate : 067 BPM     P-R Int : 150 ms          QRS Dur : 078 ms      QT Int : 384 ms       P-R-T Axes : 075 089 053 degrees     QTc Int : 405 ms    Normal sinus rhythm  Normal ECG  No previous ECGs available  Confirmed by SILVANA BAIN MD ([id number]) on 4/29/2024 8:09:43 PM    Referred By: [email or web address]   SELF           Confirmed By:SILVANA BAIN MD                      Wet Read by Alice Guzman DO (04/29/24 12:57:19, [facility] Emergency Medicine)    Rate of 67 beats per minute.  Normal sinus rhythm.  Normal axis.  No ST segment elevation.  No STEMI.  No prolonged QT                                    Imaging Results:  Imaging Results              X-Ray Chest 1 View (Final result)  Result time 04/29/24 12:53:47      Final result by Julieta Tijerina (Edward), MD (04/29/24 12:53:47)                   Impression:      Negative single view chest x-ray.      Electronically signed by: Julieta Tijerina MD  Date:    04/29/2024  Time:    12:53               Narrative:    EXAMINATION:  XR CHEST 1 VIEW    CLINICAL HISTORY:  <Diagnosis>, shortness of breath;    COMPARISON:  None    FINDINGS:  Heart size is normal. The lung fields are clear. No acute cardiopulmonary infiltrate.  Old healed 10th right posterolateral rib fracture.                                       Type of Interpretation: ED Physician (Independently Interpreted).  Radiology Procedure Done: Portable CXR.  Interpretation: Normal cardiac silhouette.  Clear lungs.  Old right-sided rib fracture.          The Emergency Provider reviewed the vital signs and test results, which are outlined above.     ED Discussion   ED Medication(s):  Medications   sodium chloride 0.9% bolus 1,000 mL 1,000 mL (0 mLs Intravenous  Stopped 4/29/24 1429)       ED Course as of 04/29/24 2126 Mon Apr 29, 2024   1252 Hemoglobin(!): 11.6 [LB]   1252 Hematocrit(!): 35.8 [LB]   1607 Patient was able to ambulate throughout the emergency department.  I believe patient's symptoms are brought on by the carbidopa as well as Valium taken last night.  Patient is very much awake, alert, oriented.  She was able to ambulate.  No acute distress.  Patient counseled on avoiding those medications [LB]      ED Course User Index  [LB] Alice Guzman, DO        4:07 PM  Reassessment: Dr. Guzman reassessed the pt.  The pt is resting comfortably and is NAD.  Pt states their sx have improved. Discussed test results, shared treatment plan, specific conditions for return, and the need for f/u.  Answered their questions at this time.  Pt understands and agrees to the plan.  The pt has remained hemodynamically stable through ED course and is stable for discharge.    I discussed with patient and/or family/caretaker that evaluation in the ED does not suggest any emergent or life threatening medical conditions requiring immediate intervention beyond what was provided in the ED, and I believe patient is safe for discharge.  Regardless, an unremarkable evaluation in the ED does not preclude the development or presence of a serious of life threatening condition. As such, patient was instructed to return immediately for any worsening or change in current symptoms.     MIPS Measures     Smoker? Yes, patient is a former smoker     Hypertension:  None     Medical Decision Making                 Medical Decision Making  Differential diagnosis: Medication reaction, orthostatic hypotension, symptomatic anemia, pregnancy, acute kidney injury, GI bleed, pregnancy, arrhythmia    EKG:  No arrhythmia.  Sinus rhythm.  Labs:  WBC nl.  H/H 11.6/35.8.  Stable, Previous 11.1/33.7.  CMP normal.  Troponin 0.008, BNP 10, influenza negative, COVID negative.  Chest xray:  No acute.    Patient  "was given 1 L normal saline.  Patient most likely is near syncopal secondary to the effects of medication taking 10 mg of Valium as well as 2 carbidopa medications.  Patient only weighs around 86 lb.  Patient was observed in the emergency department.  After fluids, patient was able to ambulate without difficulty.  Patient was counseled on not taking her 's Valium or carbidopa medications.    Amount and/or Complexity of Data Reviewed  External Data Reviewed: labs and ECG.  Labs: ordered. Decision-making details documented in ED Course.  Radiology: ordered and independent interpretation performed. Decision-making details documented in ED Course.  ECG/medicine tests: ordered and independent interpretation performed. Decision-making details documented in ED Course.        Coding    Prescription Management: I performed a review of the patient's current Rx medication list as input by nursing staff.    Discharge Medication List as of 4/29/2024  4:09 PM        CONTINUE these medications which have NOT CHANGED    Details   erythromycin (ROMYCIN) ophthalmic ointment Place a 1/2 inch ribbon of ointment into the left lower eyelid 6 times daily., Print              Discussed case with:N/A    Portions of this note may have been created with voice recognition software. Occasional "wrong-word" or "sound-a-like" substitutions may have occurred due to the inherent limitations of voice recognition software. Please, read the note carefully and recognize, using context, where substitutions have occurred.          Clinical Impression       ICD-10-CM ICD-9-CM   1. Lightheadedness  R42 780.4   2. Shortness of breath  R06.02 786.05   3. Adverse effect of drug, initial encounter  T50.905A E947.9         ED Disposition  Disposition:   Disposition: Discharged  Condition: Stable    ED Follow-up   Follow-up Information       [facility]    Why: Do not take Valium, do not take carbidopa.  Return to emergency department for " confusion, lightheadedness, dizziness, feeling going to pass out, or worsening concerns  Contact information:  [address] [facility] [id number] [phone]                               Scribe Attestation:   Scribe #1: I performed the above scribed service and the documentation accurately describes the services I performed. I attest to the accuracy of the note.     Attending:   Physician Attestation Statement for Scribe #1: I, Alice Guzman DO, personally performed the services described in this documentation, as scribed by Lanie Jacobsen, in my presence, and it is both accurate and complete.                Alice Guzman DO  04/29/24 212

## 2025-04-27 ENCOUNTER — HOSPITAL ENCOUNTER (EMERGENCY)
Facility: HOSPITAL | Age: 50
Discharge: HOME | End: 2025-04-27
Attending: EMERGENCY MEDICINE
Payer: COMMERCIAL

## 2025-04-27 VITALS
DIASTOLIC BLOOD PRESSURE: 75 MMHG | OXYGEN SATURATION: 100 % | RESPIRATION RATE: 19 BRPM | HEART RATE: 83 BPM | TEMPERATURE: 99 F | SYSTOLIC BLOOD PRESSURE: 123 MMHG | BODY MASS INDEX: 17.85 KG/M2 | WEIGHT: 110.63 LBS

## 2025-04-27 DIAGNOSIS — S52.514A NONDISPLACED FRACTURE OF RIGHT RADIAL STYLOID PROCESS, INITIAL ENCOUNTER FOR CLOSED FRACTURE: Primary | ICD-10-CM

## 2025-04-27 DIAGNOSIS — S69.90XA WRIST INJURY: ICD-10-CM

## 2025-04-27 PROCEDURE — 25000003 PHARM REV CODE 250: Performed by: NURSE PRACTITIONER

## 2025-04-27 PROCEDURE — 99283 EMERGENCY DEPT VISIT LOW MDM: CPT | Mod: 25

## 2025-04-27 RX ORDER — IBUPROFEN 800 MG/1
800 TABLET ORAL
Status: COMPLETED | OUTPATIENT
Start: 2025-04-27 | End: 2025-04-27

## 2025-04-27 RX ADMIN — IBUPROFEN 800 MG: 800 TABLET, FILM COATED ORAL at 05:04

## 2025-04-27 NOTE — Clinical Note
"Sangeeta Ohara (Amy)e was seen and treated in our emergency department on 4/27/2025.  She may return to work on 05/01/2025.       If you have any questions or concerns, please don't hesitate to call.      Rangel Espino NP"

## 2025-04-27 NOTE — ED PROVIDER NOTES
Encounter Date: 4/27/2025       History     Chief Complaint   Patient presents with    Arm Injury     Fall while roller skating x 1 week ago and injured right arm. Pt seen at [facility] Friday and received a xray and was told she has a radial fracture.      Patient presents the ED with a 1 week known right forearm fracture.  Patient here today because she was unable to get into see an orthopedic doctor.  Denies worsening pain or problem        Review of patient's allergies indicates:  No Known Allergies  No past medical history on file.  No past surgical history on file.  No family history on file.  Social History[1]  Review of Systems   Constitutional:  Negative for fever.   HENT:  Negative for sore throat.    Respiratory:  Negative for shortness of breath.    Cardiovascular:  Negative for chest pain.   Gastrointestinal:  Negative for nausea.   Genitourinary:  Negative for dysuria.   Musculoskeletal:  Negative for back pain.   Skin:  Negative for rash.   Neurological:  Negative for weakness.   Hematological:  Does not bruise/bleed easily.       Physical Exam     Initial Vitals [04/27/25 1636]   BP Pulse Resp Temp SpO2   (!) 104/54 103 15 98.5 °F (36.9 °C) 97 %      MAP       --         Physical Exam    Nursing note and vitals reviewed.  Constitutional: She appears well-developed and well-nourished. She is not diaphoretic. She is active.  Non-toxic appearance. No distress.   HENT:   Head: Normocephalic and atraumatic.   Eyes: Conjunctivae are normal. Right eye exhibits no discharge. Left eye exhibits no discharge. No scleral icterus.   Neck:   Normal range of motion.  Cardiovascular:  Normal rate, regular rhythm and intact distal pulses.           No murmur heard.  Pulmonary/Chest: Breath sounds normal. No respiratory distress. She has no wheezes.   Abdominal: She exhibits no distension.   Musculoskeletal:         General: No tenderness. Normal range of motion.      Cervical back: Normal range of motion.       Comments: No gross deformity of the right wrist     Neurological: She is alert and oriented to person, place, and time. No cranial nerve deficit. GCS score is 15. GCS eye subscore is 4. GCS verbal subscore is 5. GCS motor subscore is 6.   Skin: Skin is warm and dry. Capillary refill takes less than 2 seconds. No rash noted.   Psychiatric: She has a normal mood and affect. Her behavior is normal. Judgment and thought content normal.         ED Course   Procedures  Labs Reviewed   HEPATITIS C ANTIBODY   HEP C VIRUS HOLD SPECIMEN   HIV 1 / 2 ANTIBODY          Imaging Results              X-Ray Wrist Complete Right (Final result)  Result time 04/27/25 17:07:43      Final result by Cedrick Lowry MD (04/27/25 17:07:43)                   Impression:      1.  Nondisplaced radial styloid fracture.    Finalized on: 4/27/2025 5:07 PM By:  Cedrick Lowry MD  [facility]# [id number]      2025-04-27 17:09:47.015     [facility]               Narrative:      EXAM: XR WRIST COMPLETE 3 VIEWS RIGHT    CLINICAL INDICATION:   Unspecified injury to the right wrist.  Pain in right wrist    FINDINGS:  No comparison studies are available.  3 views of the right wrist were submitted for interpretation.  Nondisplaced fracture of the radial styloid process.    Alignment is satisfactory. No other fractures, dislocations, or erosive arthritic change.  Negative for radiopaque foreign bodies or air in the soft tissues.  An ACE wrap is in place.                                         Medications   ibuprofen tablet 800 mg (has no administration in time range)     Medical Decision Making  Differential diagnosis considered but not limited to; wrist fracture, wrist sprain    Amount and/or Complexity of Data Reviewed  Radiology: ordered.    Risk  Prescription drug management.                                      Clinical Impression:  Final diagnoses:  [S69.90XA] Wrist injury  [S52.514A] Nondisplaced fracture of right radial styloid process, initial encounter for  closed fracture (Primary)          ED Disposition Condition    Discharge Good          ED Prescriptions    None       Follow-up Information       Follow up With Specialties Details Why Contact Info    [facility], [facility]  Schedule an appointment as soon as possible for a visit in 3 days  [address]  [place] [address]  [phone]                 [1]   Social History  Tobacco Use    Smoking status: Former     Current packs/day: 1.00     Types: Cigarettes    Smokeless tobacco: Current   Substance Use Topics    Alcohol use: No    Drug use: No        Rangel Espino NP  04/27/25 173